# Patient Record
Sex: MALE | Race: WHITE | NOT HISPANIC OR LATINO | Employment: PART TIME | URBAN - METROPOLITAN AREA
[De-identification: names, ages, dates, MRNs, and addresses within clinical notes are randomized per-mention and may not be internally consistent; named-entity substitution may affect disease eponyms.]

---

## 2024-05-24 ENCOUNTER — HOSPITAL ENCOUNTER (EMERGENCY)
Facility: MEDICAL CENTER | Age: 59
End: 2024-05-24
Attending: STUDENT IN AN ORGANIZED HEALTH CARE EDUCATION/TRAINING PROGRAM
Payer: MEDICAID

## 2024-05-24 VITALS
SYSTOLIC BLOOD PRESSURE: 117 MMHG | OXYGEN SATURATION: 98 % | HEART RATE: 85 BPM | DIASTOLIC BLOOD PRESSURE: 76 MMHG | BODY MASS INDEX: 28.23 KG/M2 | RESPIRATION RATE: 16 BRPM | WEIGHT: 220 LBS | TEMPERATURE: 97.6 F | HEIGHT: 74 IN

## 2024-05-24 DIAGNOSIS — F15.10 METHAMPHETAMINE ABUSE (HCC): ICD-10-CM

## 2024-05-24 DIAGNOSIS — R45.851 SUICIDAL IDEATION: ICD-10-CM

## 2024-05-24 LAB
AMPHET UR QL SCN: POSITIVE
BARBITURATES UR QL SCN: NEGATIVE
BENZODIAZ UR QL SCN: NEGATIVE
BZE UR QL SCN: NEGATIVE
CANNABINOIDS UR QL SCN: POSITIVE
FENTANYL UR QL: NEGATIVE
METHADONE UR QL SCN: NEGATIVE
OPIATES UR QL SCN: NEGATIVE
OXYCODONE UR QL SCN: NEGATIVE
PCP UR QL SCN: NEGATIVE
POC BREATHALIZER: 0 PERCENT (ref 0–0.01)
PROPOXYPH UR QL SCN: NEGATIVE

## 2024-05-24 PROCEDURE — 302970 POC BREATHALIZER: Performed by: STUDENT IN AN ORGANIZED HEALTH CARE EDUCATION/TRAINING PROGRAM

## 2024-05-24 PROCEDURE — 700102 HCHG RX REV CODE 250 W/ 637 OVERRIDE(OP): Performed by: STUDENT IN AN ORGANIZED HEALTH CARE EDUCATION/TRAINING PROGRAM

## 2024-05-24 PROCEDURE — 90791 PSYCH DIAGNOSTIC EVALUATION: CPT

## 2024-05-24 PROCEDURE — 700102 HCHG RX REV CODE 250 W/ 637 OVERRIDE(OP): Mod: UD | Performed by: EMERGENCY MEDICINE

## 2024-05-24 PROCEDURE — A9270 NON-COVERED ITEM OR SERVICE: HCPCS | Mod: UD | Performed by: EMERGENCY MEDICINE

## 2024-05-24 PROCEDURE — 80307 DRUG TEST PRSMV CHEM ANLYZR: CPT

## 2024-05-24 PROCEDURE — 99285 EMERGENCY DEPT VISIT HI MDM: CPT

## 2024-05-24 PROCEDURE — A9270 NON-COVERED ITEM OR SERVICE: HCPCS | Performed by: STUDENT IN AN ORGANIZED HEALTH CARE EDUCATION/TRAINING PROGRAM

## 2024-05-24 RX ORDER — BUPROPION HYDROCHLORIDE 150 MG/1
150 TABLET, EXTENDED RELEASE ORAL
Status: SHIPPED | COMMUNITY
End: 2024-07-07

## 2024-05-24 RX ORDER — TRAZODONE HYDROCHLORIDE 100 MG/1
150 TABLET ORAL NIGHTLY
Status: SHIPPED | COMMUNITY
End: 2024-07-07

## 2024-05-24 RX ORDER — ALBUTEROL SULFATE 90 UG/1
1-2 INHALANT RESPIRATORY (INHALATION) EVERY 4 HOURS PRN
COMMUNITY

## 2024-05-24 RX ORDER — DIAZEPAM 5 MG/1
5 TABLET ORAL ONCE
Status: COMPLETED | OUTPATIENT
Start: 2024-05-24 | End: 2024-05-24

## 2024-05-24 RX ORDER — QUETIAPINE FUMARATE 100 MG/1
400 TABLET, FILM COATED ORAL ONCE
Status: COMPLETED | OUTPATIENT
Start: 2024-05-24 | End: 2024-05-24

## 2024-05-24 RX ORDER — ESCITALOPRAM OXALATE 20 MG/1
20 TABLET ORAL DAILY
Status: SHIPPED | COMMUNITY
End: 2024-07-07

## 2024-05-24 RX ORDER — TIOTROPIUM BROMIDE INHALATION SPRAY 3.12 UG/1
5 SPRAY, METERED RESPIRATORY (INHALATION) DAILY
COMMUNITY

## 2024-05-24 RX ORDER — QUETIAPINE FUMARATE 100 MG/1
100 TABLET, FILM COATED ORAL 2 TIMES DAILY
Status: DISCONTINUED | OUTPATIENT
Start: 2024-05-24 | End: 2024-05-24 | Stop reason: HOSPADM

## 2024-05-24 RX ORDER — ALBUTEROL SULFATE 90 UG/1
1-2 INHALANT RESPIRATORY (INHALATION) EVERY 4 HOURS PRN
Status: DISCONTINUED | OUTPATIENT
Start: 2024-05-24 | End: 2024-05-24 | Stop reason: HOSPADM

## 2024-05-24 RX ORDER — TRAZODONE HYDROCHLORIDE 50 MG/1
100 TABLET, FILM COATED ORAL NIGHTLY
Status: DISCONTINUED | OUTPATIENT
Start: 2024-05-24 | End: 2024-05-24 | Stop reason: HOSPADM

## 2024-05-24 RX ORDER — QUETIAPINE 400 MG/1
400 TABLET, FILM COATED, EXTENDED RELEASE ORAL NIGHTLY
Status: SHIPPED | COMMUNITY
End: 2024-07-07

## 2024-05-24 RX ADMIN — TIOTROPIUM BROMIDE INHALATION SPRAY 5 MCG: 3.12 SPRAY, METERED RESPIRATORY (INHALATION) at 13:49

## 2024-05-24 RX ADMIN — DIAZEPAM 5 MG: 5 TABLET ORAL at 02:33

## 2024-05-24 RX ADMIN — QUETIAPINE FUMARATE 400 MG: 100 TABLET ORAL at 05:21

## 2024-05-24 NOTE — DISCHARGE PLANNING
"ALERT team  note:  58 year old male BIB EMS d/t passing suicidal ideation, to get shot by police, auditory hallucinations, legal hold; paranoia and delusions, and current substance use including Methamphetamines and ETOH; UDS + for Amphetamines and THC; he states current substance use include ETOH up to 2 pints daily with last use 5/23/24, Methamphetamines vocationally with last use 5/23/24, and THC occasionally with last use this week; he denies current outpt MH providers; states h/o psych meds include Seroquel, Trazodone, and Buspar but has not taken for monthes; with recent CD tx, sober living, at 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 ED Alert team RN last night); he denies owning or access to a gun or weapons; no acute MH crisis noted a this time and pt actively participating in safe DC planning; writer RN updated [facility] ERP Dr. Mcclain and legal hold DC'd    Writer RN reviewed community CD and MH resources with pt, with written information given, including AA mtgs, [facility], [facility], [facility], and  [facility] as his [place] insurance application is submitted and pending authorization; writer RN encouraged pt to f/u at outpt MH service and attend a sober support meeting; pt verbalized understanding; pt to DC to self to today with a 1 day " bus pass given    [place] Medicaid insurance plan pending

## 2024-05-24 NOTE — ED NOTES
Pt resting in bed, GCS 15, NAD, 1:1 sitter at door, pt aware POC to transfer to appropriate facility when available

## 2024-05-24 NOTE — ED NOTES
Patient's home medications have been reviewed by the pharmacy team.     No past medical history on file.    Patient's Medications   New Prescriptions    No medications on file   Previous Medications    ALBUTEROL 108 (90 BASE) MCG/ACT AERO SOLN INHALATION AEROSOL    Inhale 1-2 Puffs every four hours as needed for Shortness of Breath.    BUPROPION SR (WELLBUTRIN-SR) 150 MG TABLET SR 12 HR SUSTAINED-RELEASE TABLET    Take 150 mg by mouth.    ESCITALOPRAM (LEXAPRO) 20 MG TABLET    Take 20 mg by mouth every day.    QUETIAPINE (SEROQUEL XR) 400 MG XR TABLET    Take 400 mg by mouth every evening.    TIOTROPIUM (SPIRIVA RESPIMAT) 2.5 MCG/ACT AERO SOLN    Inhale 5 mcg every day.    TRAZODONE (DESYREL) 100 MG TAB    Take 150 mg by mouth every evening.   Modified Medications    No medications on file   Discontinued Medications    TIOTROPIUM BROMIDE MONOHYDRATE INH    Inhale every day.          A:  Medications do not appear to be contributing to current complaints.     Medication history obtained from discussion with [facility] pharmacy and the patient. Patient reports not taking home medications for last 30 days      P:    Home medications have been reordered as appropriate per discussion with provider     Since patient reported not taking home medications for last 30 days quetiapine and trazodone were started at lower starting dose, did not order lexapro or wellbutrin per discussion with provider.    Home inhalers ordered             Poppy Banks, PharmD

## 2024-05-24 NOTE — ED NOTES
Pt discharged to lobby with steady gait. GCS 15. Pt in possession of belongings. Pt provided discharge education and information pertaining to medications and follow up appointments. Pt received copy of discharge instructions and verbalized understanding.     Vitals:    05/24/24 1000   BP: 117/76   Pulse: 85   Resp: 16   Temp: 36.4 °C (97.6 °F)   SpO2: 98%

## 2024-05-24 NOTE — DISCHARGE PLANNING
[facility] ED Behavioral Health Fax Referral      Referral: Legal Hold    Intervention: Patient referral to community inpatient  facillity    Legal Hold Initiated: Date: 05/24/2024 Time: --    Patient’s Insurance Listed on Face Sheet: None Listed; Medicaid possibly pending    Referrals sent to: [facility], [facility], [facility], and [facility] via [facility]     Referrals faxed by RYLEY Arrington    This referral contains the following information:  Face sheet _x___  Page 1 and Page 2 of Legal Hold __x__  Alert Team Assessment/Psych Assessment __x__  Head to toe physical exam __x__  Urine Drug Screen ____  Blood Alcohol ___x_  Vital signs __x__  Pregnancy test when applicable ___  Medications list _x___  Covid screening ____    Plan: Patient will transfer to mental health facility once acceptance is obtained    For all referral information, please contact the  referral office daily between the hours of 7:00 a.m. to 6:00 p.m. at:   Phone [phone]   Fax [phone]    ED  Alert Team   Phone [phone] Fax [phone]    [facility] Emergency Department Contact numbers:  Green Pod [phone]   Blue Pod [phone]   Red Pod [phone]   Pediatrics [phone]

## 2024-05-24 NOTE — ED NOTES
Med Rec partial complete per patient   Allergies reviewed  Antibiotics in the past 30 days:no  Anticoagulant in past 14 days:no  Pharmacy patient utilizes:[facility] on [address]    Pt unable to verify some strengths of medication. Pt states he ran out of his medications a month ago. Unable to verify medications/strengths/directions from pharmacy at this time (closed)

## 2024-05-24 NOTE — ED NOTES
Patient changed into paper scrubs, all non-essential equipment removed from room. Urine sample obtained and sent to lab. Patient cooperative with POC breathalyzer.
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
## 2024-05-24 NOTE — ED TRIAGE NOTES
"Chief Complaint   Patient presents with    Suicidal Ideation     Patient BIBA for hallucinations and SI. Patient states that he has been out of his medications for about a month, and has since started using meth again. Reports last meth use yesterday. Patient is calm and cooperative with assessment. Patient states he has plan to kill himself via .       BIB EMS to [place] [id number], pt on monitor and in gown, labs drawn and sent. Pt consists of: SI with hallucinations.    Medications given en route:None    Ht 1.88 m (6' 2\")   Wt 99.8 kg (220 lb)   BMI 28.25 kg/m²   "

## 2024-05-24 NOTE — DISCHARGE PLANNING
Alert Team/Behavioral Health   Note:      [facility] ALERT TEAM DISCHARGE PLANNING NOTE    Date:  5/24/24  Patient Name:  Krystina Bonner - 58 y.o. - Discharge Planning  MRN:  [id number]   YOB: 1965  ADMISSION DATE:  5/24/2024     Writer forwarded referral packet for inpatient psychiatric care to the following community providers:  [facility]    Items included in the referral packet:   _x____Face Sheet   _x____Pages 1 and 2 of completed legal hold   _x____Alert Team/Psych Assessment   _x____H&P   _x____UDS   _x____Blood Alcohol   _x____Vital signs   _n/a____Pregnancy Test (if applicable)   _x____Medications List   _____Covid Screen

## 2024-05-24 NOTE — ED NOTES
Bedside report from RYLEY Mayen. Pt resting in r[place]ell with even and unlabored chest rise and fall, awakes easily to stimuli. 1:1 sitter in place. Care will continue as ordered.  Pt is on RA, GCS 15.  Necessary fall precautions in place.

## 2024-05-24 NOTE — ED PROVIDER NOTES
"ED Provider Note    CHIEF COMPLAINT  Chief Complaint   Patient presents with    Suicidal Ideation       EXTERNAL RECORDS REVIEWED  Other no prior records available    HPI/ROS  LIMITATION TO HISTORY   Select: : None      Eliushona Ankit Bonner is a 58 y.o. male who presents to the emergency department for evaluation of auditory hallucinations and suicidal ideation.  He reports a history of bipolar disorder and schizophrenia on multiple medications that he has not been taking recently because he ran out.  He reports that the auditory hallucinations have worsened and are quite aggressive and telling him to kill himself.  He states that he smoked methamphetamine yesterday which exacerbated his symptoms.  He states that if he did not come to the ER tonight he was going to commit suicide by .  He states that he cut himself on the right arm with a paring knife in an attempt to relieve stress but not kill himself.  He denies doing anything else to hurt himself today including taking medications.    PAST MEDICAL HISTORY   Bipolar disorder, schizophrenia    SURGICAL HISTORY  patient denies any surgical history    FAMILY HISTORY  No family history on file.    SOCIAL HISTORY  Methamphetamine use, alcohol use, marijuana use      CURRENT MEDICATIONS  Home Medications    **Home medications have not yet been reviewed for this encounter**         ALLERGIES  No Known Allergies    PHYSICAL EXAM  VITAL SIGNS: Ht 1.88 m (6' 2\")   Wt 99.8 kg (220 lb)   BMI 28.25 kg/m²    Constitutional: Hyperactive, anxious  HENT: Normocephalic, Atraumatic, Bilateral external ears normal. Nose normal.   Eyes: Pupils are somewhat dilated but equal. Conjunctiva normal, non-icteric.   Heart: Tachycardic, regular  Lungs: No respiratory distress  GI: Soft nontender nondistended   Musculoskeletal: No obvious deformity. No leg edema.  Skin: Warm, moist, No erythema, No rash.  Linear abrasion to the right anterior forearm with no associated bleeding.  No " laceration.  Neurologic: Alert and oriented x 3, Cranial nerves III through XII grossly intact no sensory deficit no cerebellar dysfunction.   Psychiatric: Anxious, suicidal ideation      EKG/LABS  Labs Reviewed   URINE DRUG SCREEN - Abnormal; Notable for the following components:       Result Value    Amphetamines Urine Positive (*)     Cannabinoid Metab Positive (*)     All other components within normal limits   POC BREATHALIZER - Normal         COURSE & MEDICAL DECISION MAKING    ASSESSMENT, COURSE AND PLAN  Care Narrative:     Patient arrives emergency department for evaluation of auditory hallucinations and suicidal ideation in the setting of methamphetamine abuse.  Also history of schizophrenia, bipolar disorder, medication nonadherence.  Likely exacerbation of underlying psychiatric illness in the setting of sympathomimetic use.  Evidence of current sympathomimetic intoxication the patient is otherwise calm and cooperative with medical care and requested diagnostic testing.  Will treat with oral Valium and plan for behavioral health evaluation.    ED OBS: Yes; I am placing the patient in to an observation status due to a diagnostic uncertainty as well as therapeutic intensity. Patient placed in observation status at 2:18 AM, 5/24/2024.     Observation plan is as follows: Observation pending clinical sobriety and evaluation by behavioral health team members to assess need for inpatient psychiatric care     Behavioral health team member Rose evaluated the patient and agrees that his psychiatric decompensation is severe enough to warrant inpatient hospitalization.  Legal hold initiated and certified by myself though patient cooperating with plan of care for inpatient transfer.  Will plan for observation in the ER until safe inpatient transfer can be arranged.    ADDITIONAL PROBLEMS MANAGED  Polysubstance abuse disorder    DISPOSITION AND DISCUSSIONS  I have discussed management of the patient with the  following physicians and WADE's: Oncoming ERP Dr. Mcclain    Discussion of management with other Q[facility] or appropriate source(s): None     Barriers to care at this time, including but not limited to: Patient does not have established PCP and Patient had difficult affording medications.       FINAL DIAGNOSIS  1. Suicidal ideation    2. Methamphetamine abuse (HCC)           Electronically signed by: Dylon Mansfield M.D., 5/24/2024 2:18 AM

## 2024-05-24 NOTE — ED NOTES
Pt resting in room. Pt given a pillow for comfort. Sitter outside of room. No acute distress noted. Will continue to monitor.

## 2024-05-24 NOTE — DISCHARGE PLANNING
Alert Team/Behavioral Health   Note:      - [facility]: Patient is pending [place] Medicaid. Since patient first is given Medicaid FFS before it can be changed to another [place] Medicaid plan, referral has to further reviewed by  and financial office.  @1110:  (Raymond) called. Cannot take pending Medicaid per financial office. Will need Medicaid to be active prior to being able to accept.    - [facility]: Talked to  (Dasia). Does not take pending Medicaid. Active Medicaid also has to appear as elgibile on EVS.    - [facility] : Does not accept pending Medicaid.      - [facility]: Sent referral @ 1414.  Will not be taking any admissions until 5/31/24 due to Covid outbreak.

## 2024-05-24 NOTE — ED NOTES
Pt resting in room. Sitter outside of door. Vs stable. No acute distress noted. Will continue to monitor.

## 2024-05-24 NOTE — DISCHARGE SUMMARY
"  ED Observation Discharge Summary    Patient:Krystina Bonner  Patient : 1965  Patient MRN: [id number]  Patient PCP: Pcp Unknown    Admit Date: 2024  Discharge Date and Time: 24 1:26 PM  Discharge Diagnosis:   1. Suicidal ideation    2. Methamphetamine abuse (HCC)        Discharge Attending: Marcell Mcclain M.D.  Discharge Service: ED Observation    ED Course  Krystina is a 58 y.o. male who was evaluated at [facility] emergency department with methamphetamine use and suicidal ideation.  Is been a bit paranoid.  He rested well in the emergency department.  He is now feeling better.  Seen by the behavioral health team.  We both agree that this patient does not meet legal 2000 criteria.  He has safety plan.  Given outpatient resources.  Plans to follow-up and go to AA.  Also plans to try to stop using drugs and alcohol.  He has some section 8 housing.  He is forward thinking and future oriented.    Discharge Exam:  /76   Pulse 85   Temp 36.4 °C (97.6 °F) (Temporal)   Resp 16   Ht 1.88 m (6' 2\")   Wt 99.8 kg (220 lb)   SpO2 98%   BMI 28.25 kg/m² .    Constitutional: Awake and alert. Nontoxic  HENT:  Grossly normal  Eyes: Grossly normal  Neck: Normal range of motion  Cardiovascular: Normal heart rate   Thorax & Lungs: No respiratory distress  Abdomen: Nontender  Skin:  No pathologic rash.   Extremities: Well perfused  Psychiatric: Affect normal    Labs  Results for orders placed or performed during the hospital encounter of 24   Urine Drug Screen   Result Value Ref Range    Amphetamines Urine Positive (A) Negative    Barbiturates Negative Negative    Benzodiazepines Negative Negative    Cocaine Metabolite Negative Negative    Fentanyl, Urine Negative Negative    Methadone Negative Negative    Opiates Negative Negative    Oxycodone Negative Negative    Phencyclidine -Pcp Negative Negative    Propoxyphene Negative Negative    Cannabinoid Metab Positive (A) Negative   POC " BREATHALIZER   Result Value Ref Range    POC Breathalizer 0.000 0.00 - 0.01 Percent       Radiology  No orders to display       Medications:   New Prescriptions    No medications on file       My final assessment includes   1. Suicidal ideation    2. Methamphetamine abuse (HCC)        Upon Reevaluation, the patient's condition has: Improved; and will be discharged.    Patient discharged from ED Observation status at 1:27 PM  (Time) 5/24/2024  (Date).     Total time spent on this ED Observation discharge encounter is < 30 Minutes    Electronically signed by: Marcell Mcclain M.D., 5/24/2024 1:26 PM

## 2024-05-24 NOTE — ED NOTES
Bedside report received from off going RN, assumed care of patient.  POC discussed with patient. Call light within reach, all needs addressed at this time.       Fall risk interventions in place: Keep floor surfaces clean and dry (all applicable per [place] Fall risk assessment)   Continuous monitoring: Not Applicable   IVF/IV medications: Not Applicable   Oxygen: Room Air  Bedside sitter: Pt on L2k SI with 1:1 sitter   (name), Report given to sitter, checklist completed, and checklist completed, stop sign in doorway  Isolation: Not Applicable

## 2024-05-24 NOTE — ED NOTES
Pt provided all belongings and is changing in room. Pt is becoming verbally aggressive since being told he was being discharged, security on standby.

## 2024-07-07 ENCOUNTER — APPOINTMENT (OUTPATIENT)
Dept: RADIOLOGY | Facility: MEDICAL CENTER | Age: 59
End: 2024-07-07
Attending: STUDENT IN AN ORGANIZED HEALTH CARE EDUCATION/TRAINING PROGRAM

## 2024-07-07 ENCOUNTER — HOSPITAL ENCOUNTER (EMERGENCY)
Facility: MEDICAL CENTER | Age: 59
End: 2024-07-07
Attending: STUDENT IN AN ORGANIZED HEALTH CARE EDUCATION/TRAINING PROGRAM

## 2024-07-07 VITALS
WEIGHT: 214.51 LBS | DIASTOLIC BLOOD PRESSURE: 74 MMHG | TEMPERATURE: 98.6 F | OXYGEN SATURATION: 95 % | SYSTOLIC BLOOD PRESSURE: 127 MMHG | RESPIRATION RATE: 13 BRPM | HEART RATE: 97 BPM | HEIGHT: 75 IN | BODY MASS INDEX: 26.67 KG/M2

## 2024-07-07 DIAGNOSIS — F32.A DEPRESSION, UNSPECIFIED DEPRESSION TYPE: ICD-10-CM

## 2024-07-07 DIAGNOSIS — F10.930 ALCOHOL WITHDRAWAL SYNDROME WITHOUT COMPLICATION (HCC): ICD-10-CM

## 2024-07-07 LAB
ALBUMIN SERPL BCP-MCNC: 4.8 G/DL (ref 3.2–4.9)
ALBUMIN/GLOB SERPL: 1.9 G/DL
ALP SERPL-CCNC: 118 U/L (ref 30–99)
ALT SERPL-CCNC: 191 U/L (ref 2–50)
AMPHET UR QL SCN: NEGATIVE
ANION GAP SERPL CALC-SCNC: 19 MMOL/L (ref 7–16)
APPEARANCE UR: ABNORMAL
AST SERPL-CCNC: 190 U/L (ref 12–45)
BACTERIA #/AREA URNS HPF: NEGATIVE /HPF
BARBITURATES UR QL SCN: NEGATIVE
BASOPHILS # BLD AUTO: 0.3 % (ref 0–1.8)
BASOPHILS # BLD: 0.03 K/UL (ref 0–0.12)
BENZODIAZ UR QL SCN: NEGATIVE
BILIRUB SERPL-MCNC: 1.5 MG/DL (ref 0.1–1.5)
BILIRUB UR QL STRIP.AUTO: ABNORMAL
BUN SERPL-MCNC: 14 MG/DL (ref 8–22)
BZE UR QL SCN: NEGATIVE
CALCIUM ALBUM COR SERPL-MCNC: 9.2 MG/DL (ref 8.5–10.5)
CALCIUM SERPL-MCNC: 9.8 MG/DL (ref 8.5–10.5)
CANNABINOIDS UR QL SCN: POSITIVE
CHLORIDE SERPL-SCNC: 98 MMOL/L (ref 96–112)
CO2 SERPL-SCNC: 21 MMOL/L (ref 20–33)
COLOR UR: ABNORMAL
CREAT SERPL-MCNC: 1 MG/DL (ref 0.5–1.4)
EOSINOPHIL # BLD AUTO: 0.01 K/UL (ref 0–0.51)
EOSINOPHIL NFR BLD: 0.1 % (ref 0–6.9)
EPI CELLS #/AREA URNS HPF: NEGATIVE /HPF
ERYTHROCYTE [DISTWIDTH] IN BLOOD BY AUTOMATED COUNT: 46.6 FL (ref 35.9–50)
ETHANOL BLD-MCNC: <10.1 MG/DL
FENTANYL UR QL: NEGATIVE
GFR SERPLBLD CREATININE-BSD FMLA CKD-EPI: 87 ML/MIN/1.73 M 2
GLOBULIN SER CALC-MCNC: 2.5 G/DL (ref 1.9–3.5)
GLUCOSE SERPL-MCNC: 103 MG/DL (ref 65–99)
GLUCOSE UR STRIP.AUTO-MCNC: NEGATIVE MG/DL
HCT VFR BLD AUTO: 49.5 % (ref 42–52)
HGB BLD-MCNC: 17.5 G/DL (ref 14–18)
HYALINE CASTS #/AREA URNS LPF: ABNORMAL /LPF
IMM GRANULOCYTES # BLD AUTO: 0.07 K/UL (ref 0–0.11)
IMM GRANULOCYTES NFR BLD AUTO: 0.7 % (ref 0–0.9)
KETONES UR STRIP.AUTO-MCNC: 80 MG/DL
LEUKOCYTE ESTERASE UR QL STRIP.AUTO: ABNORMAL
LIPASE SERPL-CCNC: 20 U/L (ref 11–82)
LYMPHOCYTES # BLD AUTO: 0.95 K/UL (ref 1–4.8)
LYMPHOCYTES NFR BLD: 9.4 % (ref 22–41)
MCH RBC QN AUTO: 32 PG (ref 27–33)
MCHC RBC AUTO-ENTMCNC: 35.4 G/DL (ref 32.3–36.5)
MCV RBC AUTO: 90.5 FL (ref 81.4–97.8)
METHADONE UR QL SCN: NEGATIVE
MICRO URNS: ABNORMAL
MONOCYTES # BLD AUTO: 1.03 K/UL (ref 0–0.85)
MONOCYTES NFR BLD AUTO: 10.2 % (ref 0–13.4)
NEUTROPHILS # BLD AUTO: 8.05 K/UL (ref 1.82–7.42)
NEUTROPHILS NFR BLD: 79.3 % (ref 44–72)
NITRITE UR QL STRIP.AUTO: NEGATIVE
NRBC # BLD AUTO: 0 K/UL
NRBC BLD-RTO: 0 /100 WBC (ref 0–0.2)
OPIATES UR QL SCN: NEGATIVE
OXYCODONE UR QL SCN: NEGATIVE
PCP UR QL SCN: NEGATIVE
PH UR STRIP.AUTO: 5 [PH] (ref 5–8)
PLATELET # BLD AUTO: 129 K/UL (ref 164–446)
PMV BLD AUTO: 10.2 FL (ref 9–12.9)
POTASSIUM SERPL-SCNC: 4.1 MMOL/L (ref 3.6–5.5)
PROPOXYPH UR QL SCN: NEGATIVE
PROT SERPL-MCNC: 7.3 G/DL (ref 6–8.2)
PROT UR QL STRIP: 100 MG/DL
RBC # BLD AUTO: 5.47 M/UL (ref 4.7–6.1)
RBC # URNS HPF: ABNORMAL /HPF
RBC UR QL AUTO: ABNORMAL
SODIUM SERPL-SCNC: 138 MMOL/L (ref 135–145)
SP GR UR STRIP.AUTO: 1.03
UROBILINOGEN UR STRIP.AUTO-MCNC: 1 MG/DL
WBC # BLD AUTO: 10.1 K/UL (ref 4.8–10.8)
WBC #/AREA URNS HPF: ABNORMAL /HPF

## 2024-07-07 PROCEDURE — 700117 HCHG RX CONTRAST REV CODE 255: Performed by: STUDENT IN AN ORGANIZED HEALTH CARE EDUCATION/TRAINING PROGRAM

## 2024-07-07 PROCEDURE — 700102 HCHG RX REV CODE 250 W/ 637 OVERRIDE(OP): Performed by: STUDENT IN AN ORGANIZED HEALTH CARE EDUCATION/TRAINING PROGRAM

## 2024-07-07 PROCEDURE — 80307 DRUG TEST PRSMV CHEM ANLYZR: CPT

## 2024-07-07 PROCEDURE — 85025 COMPLETE CBC W/AUTO DIFF WBC: CPT

## 2024-07-07 PROCEDURE — 700101 HCHG RX REV CODE 250: Performed by: STUDENT IN AN ORGANIZED HEALTH CARE EDUCATION/TRAINING PROGRAM

## 2024-07-07 PROCEDURE — 80053 COMPREHEN METABOLIC PANEL: CPT

## 2024-07-07 PROCEDURE — 83690 ASSAY OF LIPASE: CPT

## 2024-07-07 PROCEDURE — 82077 ASSAY SPEC XCP UR&BREATH IA: CPT

## 2024-07-07 PROCEDURE — 90791 PSYCH DIAGNOSTIC EVALUATION: CPT

## 2024-07-07 PROCEDURE — 74177 CT ABD & PELVIS W/CONTRAST: CPT

## 2024-07-07 PROCEDURE — A9270 NON-COVERED ITEM OR SERVICE: HCPCS | Performed by: STUDENT IN AN ORGANIZED HEALTH CARE EDUCATION/TRAINING PROGRAM

## 2024-07-07 PROCEDURE — 81001 URINALYSIS AUTO W/SCOPE: CPT

## 2024-07-07 PROCEDURE — 96366 THER/PROPH/DIAG IV INF ADDON: CPT

## 2024-07-07 PROCEDURE — 96365 THER/PROPH/DIAG IV INF INIT: CPT

## 2024-07-07 PROCEDURE — 99285 EMERGENCY DEPT VISIT HI MDM: CPT

## 2024-07-07 PROCEDURE — 700111 HCHG RX REV CODE 636 W/ 250 OVERRIDE (IP): Performed by: STUDENT IN AN ORGANIZED HEALTH CARE EDUCATION/TRAINING PROGRAM

## 2024-07-07 PROCEDURE — 96375 TX/PRO/DX INJ NEW DRUG ADDON: CPT

## 2024-07-07 PROCEDURE — 36415 COLL VENOUS BLD VENIPUNCTURE: CPT

## 2024-07-07 RX ORDER — LORAZEPAM 2 MG/ML
1 INJECTION INTRAMUSCULAR
Status: DISCONTINUED | OUTPATIENT
Start: 2024-07-07 | End: 2024-07-07 | Stop reason: HOSPADM

## 2024-07-07 RX ORDER — LORAZEPAM 2 MG/ML
2 INJECTION INTRAMUSCULAR
Status: DISCONTINUED | OUTPATIENT
Start: 2024-07-07 | End: 2024-07-07 | Stop reason: HOSPADM

## 2024-07-07 RX ORDER — QUETIAPINE 400 MG/1
400 TABLET, FILM COATED, EXTENDED RELEASE ORAL NIGHTLY
Qty: 30 TABLET | Refills: 3 | Status: SHIPPED | OUTPATIENT
Start: 2024-07-07

## 2024-07-07 RX ORDER — ESCITALOPRAM OXALATE 20 MG/1
20 TABLET ORAL DAILY
Qty: 30 TABLET | Refills: 0 | Status: SHIPPED | OUTPATIENT
Start: 2024-07-07

## 2024-07-07 RX ORDER — DIAZEPAM 10 MG/2ML
10 INJECTION, SOLUTION INTRAMUSCULAR; INTRAVENOUS ONCE
Status: COMPLETED | OUTPATIENT
Start: 2024-07-07 | End: 2024-07-07

## 2024-07-07 RX ORDER — CHLORDIAZEPOXIDE HYDROCHLORIDE 25 MG/1
25 CAPSULE, GELATIN COATED ORAL ONCE
Status: COMPLETED | OUTPATIENT
Start: 2024-07-07 | End: 2024-07-07

## 2024-07-07 RX ORDER — BUPROPION HYDROCHLORIDE 150 MG/1
150 TABLET, EXTENDED RELEASE ORAL 2 TIMES DAILY
Qty: 60 TABLET | Refills: 0 | Status: SHIPPED | OUTPATIENT
Start: 2024-07-07

## 2024-07-07 RX ORDER — LORAZEPAM 2 MG/1
2 TABLET ORAL
Status: DISCONTINUED | OUTPATIENT
Start: 2024-07-07 | End: 2024-07-07 | Stop reason: HOSPADM

## 2024-07-07 RX ORDER — FOLIC ACID 1 MG/1
1 TABLET ORAL DAILY
Status: DISCONTINUED | OUTPATIENT
Start: 2024-07-08 | End: 2024-07-07 | Stop reason: HOSPADM

## 2024-07-07 RX ORDER — LORAZEPAM 2 MG/1
4 TABLET ORAL
Status: DISCONTINUED | OUTPATIENT
Start: 2024-07-07 | End: 2024-07-07 | Stop reason: HOSPADM

## 2024-07-07 RX ORDER — GAUZE BANDAGE 2" X 2"
100 BANDAGE TOPICAL DAILY
Status: DISCONTINUED | OUTPATIENT
Start: 2024-07-08 | End: 2024-07-07 | Stop reason: HOSPADM

## 2024-07-07 RX ORDER — TRAZODONE HYDROCHLORIDE 150 MG/1
150 TABLET ORAL NIGHTLY
Qty: 30 TABLET | Refills: 3 | Status: SHIPPED | OUTPATIENT
Start: 2024-07-07

## 2024-07-07 RX ORDER — QUETIAPINE FUMARATE 100 MG/1
100 TABLET, FILM COATED ORAL NIGHTLY
Status: COMPLETED | OUTPATIENT
Start: 2024-07-07 | End: 2024-07-07

## 2024-07-07 RX ORDER — SODIUM CHLORIDE, SODIUM LACTATE, POTASSIUM CHLORIDE, CALCIUM CHLORIDE 600; 310; 30; 20 MG/100ML; MG/100ML; MG/100ML; MG/100ML
1000 INJECTION, SOLUTION INTRAVENOUS ONCE
Status: DISCONTINUED | OUTPATIENT
Start: 2024-07-07 | End: 2024-07-07

## 2024-07-07 RX ORDER — LORAZEPAM 1 MG/1
0.5 TABLET ORAL EVERY 4 HOURS PRN
Status: DISCONTINUED | OUTPATIENT
Start: 2024-07-07 | End: 2024-07-07 | Stop reason: HOSPADM

## 2024-07-07 RX ORDER — CHLORDIAZEPOXIDE HYDROCHLORIDE 25 MG/1
CAPSULE, GELATIN COATED ORAL
Qty: 10 CAPSULE | Refills: 0 | Status: SHIPPED | OUTPATIENT
Start: 2024-07-07 | End: 2024-07-11

## 2024-07-07 RX ORDER — LORAZEPAM 2 MG/ML
1.5 INJECTION INTRAMUSCULAR
Status: DISCONTINUED | OUTPATIENT
Start: 2024-07-07 | End: 2024-07-07 | Stop reason: HOSPADM

## 2024-07-07 RX ORDER — LORAZEPAM 2 MG/ML
0.5 INJECTION INTRAMUSCULAR EVERY 4 HOURS PRN
Status: DISCONTINUED | OUTPATIENT
Start: 2024-07-07 | End: 2024-07-07 | Stop reason: HOSPADM

## 2024-07-07 RX ORDER — LORAZEPAM 1 MG/1
1 TABLET ORAL EVERY 4 HOURS PRN
Status: DISCONTINUED | OUTPATIENT
Start: 2024-07-07 | End: 2024-07-07 | Stop reason: HOSPADM

## 2024-07-07 RX ADMIN — DIAZEPAM 10 MG: 5 INJECTION INTRAMUSCULAR; INTRAVENOUS at 01:33

## 2024-07-07 RX ADMIN — CHLORDIAZEPOXIDE HYDROCHLORIDE 25 MG: 25 CAPSULE ORAL at 04:30

## 2024-07-07 RX ADMIN — IOHEXOL 95 ML: 350 INJECTION, SOLUTION INTRAVENOUS at 02:13

## 2024-07-07 RX ADMIN — CHLORDIAZEPOXIDE HYDROCHLORIDE 25 MG: 25 CAPSULE ORAL at 03:15

## 2024-07-07 RX ADMIN — QUETIAPINE 100 MG: 100 TABLET, FILM COATED ORAL at 03:15

## 2024-07-07 RX ADMIN — THIAMINE HYDROCHLORIDE 1000 ML: 100 INJECTION, SOLUTION INTRAMUSCULAR; INTRAVENOUS at 01:45

## 2024-07-07 NOTE — DISCHARGE PLANNING
"[facility] Crisis/Safety Plan     Name: 0400  Date: 07/07/24    Warning Signs that a Crisis may be developing.  1.    Increase in anxiety       2.     Wanting to drink     Coping Strategies I can use on my own.  1.       Distract    2.        Counting  3.              Ways I can make my environment safe  1.   No access to drugs       2.    Jr access to firearns          Things can I tell myself when I feel a crisis developing  1.      \"That this will end if end,:     2.            3.     People I can contact for support or distraction  1.      Mom  2.      Sister    3.           If I'm not able to reach my support people, or the above strategies don't help, I can contact the following professionals, agencies, or hotlines:     1) [facility] [place] (24/7): [phone] or [phone]     "

## 2024-07-07 NOTE — ED PROVIDER NOTES
ED Provider Note    CHIEF COMPLAINT  Chief Complaint   Patient presents with    Flank Pain    Diarrhea    Depression       EXTERNAL RECORDS REVIEWED  External ED Note ED note from 1/27/2023 patient seen for suicidal ideations    HPI/ROS  LIMITATION TO HISTORY     OUTSIDE HISTORIAN(S):      Krystina Bonner is a 58 y.o. male who presents with flank pain, diarrhea, anxiety.  Patient says over the past 2 or 3 days she has been having bilateral flank pain intermittent nausea.  Patient says he does drink heavily regularly.  Patient says he stopped drinking today because he ran out of money.  Patient says he has had thoughts of dying and at one point had a plan to stab himself in the arm.  Patient says he was having auditory occlusions earlier.  Patient says he not been taking his psychiatric medications.        PAST MEDICAL HISTORY       SURGICAL HISTORY  patient denies any surgical history    FAMILY HISTORY  History reviewed. No pertinent family history.    SOCIAL HISTORY  Social History     Tobacco Use    Smoking status: Never    Smokeless tobacco: Never   Substance and Sexual Activity    Alcohol use: Yes    Drug use: Not Currently    Sexual activity: Not on file       CURRENT MEDICATIONS  Home Medications       Reviewed by Maritza Tamez R.N. (Registered Nurse) on 07/07/24 at 0048  Med List Status: Not Addressed     Medication Last Dose Status   albuterol 108 (90 Base) MCG/ACT Aero Soln inhalation aerosol  Active   buPROPion SR (WELLBUTRIN-SR) 150 MG TABLET SR 12 HR sustained-release tablet  Active   escitalopram (LEXAPRO) 20 MG tablet  Active   quetiapine (SEROQUEL XR) 400 MG XR tablet  Active   tiotropium (SPIRIVA RESPIMAT) 2.5 mcg/Act Aero Soln  Active   traZODone (DESYREL) 100 MG Tab  Active                  Audit from Redirected Encounters    **Home medications have not yet been reviewed for this encounter**         ALLERGIES  No Known Allergies    PHYSICAL EXAM  VITAL SIGNS: /74   Pulse 97   Temp  "37 °C (98.6 °F)   Resp 13   Ht 1.905 m (6' 3\")   Wt 97.3 kg (214 lb 8.1 oz)   SpO2 95%   BMI 26.81 kg/m²    Constitutional: Anxious, diaphoretic  HENT: No signs of trauma, Bilateral external ears normal, Nose normal.   Eyes: Pupils are equal and reactive, Conjunctiva normal, Non-icteric.   Neck: Normal range of motion, No tenderness, Supple, No stridor.   Cardiovascular: Tachycardic  Thorax & Lungs: Normal breath sounds, No respiratory distress, No wheezing, No chest tenderness.   Abdomen: Bowel sounds normal, Soft, No tenderness, No masses, No pulsatile masses.   Skin: Warm, Dry, No erythema, No rash.   Back: No bony tenderness, No CVA tenderness.   Extremities: Intact distal pulses, No edema, No tenderness, No cyanosis  Musculoskeletal: Good range of motion in all major joints. No tenderness to palpation or major deformities noted.   Neurologic: Alert , Normal motor function, Normal sensory function, No focal deficits noted.   Psychiatric: Calm   EKG/LABS  Labs Reviewed   URINE DRUG SCREEN - Abnormal; Notable for the following components:       Result Value    Cannabinoid Metab Positive (*)     All other components within normal limits   URINALYSIS,CULTURE IF INDICATED - Abnormal; Notable for the following components:    Character Cloudy (*)     Ketones 80 (*)     Protein 100 (*)     Bilirubin Moderate (*)     Leukocyte Esterase Trace (*)     Occult Blood Trace (*)     All other components within normal limits   CBC WITH DIFFERENTIAL - Abnormal; Notable for the following components:    Platelet Count 129 (*)     Neutrophils-Polys 79.30 (*)     Lymphocytes 9.40 (*)     Neutrophils (Absolute) 8.05 (*)     Lymphs (Absolute) 0.95 (*)     Monos (Absolute) 1.03 (*)     All other components within normal limits   URINE MICROSCOPIC (W/UA) - Abnormal; Notable for the following components:    WBC 0-2 (*)     RBC 0-2 (*)     Hyaline Cast 11-20 (*)     All other components within normal limits   COMP METABOLIC PANEL - " Abnormal; Notable for the following components:    Anion Gap 19.0 (*)     Glucose 103 (*)     AST(SGOT) 190 (*)     ALT(SGPT) 191 (*)     Alkaline Phosphatase 118 (*)     All other components within normal limits   DIAGNOSTIC ALCOHOL   LIPASE   ESTIMATED GFR           RADIOLOGY/PROCEDURES   I have independently interpreted the diagnostic imaging associated with this visit and am waiting the final reading from the radiologist.   My preliminary interpretation is as follows: No free air    Radiologist interpretation:  CT-ABDOMEN-PELVIS WITH   Final Result      1.  There is no acute inflammatory process within the abdomen or pelvis.   2.  No nephrolithiasis or urolithiasis. No hydronephrosis.          COURSE & MEDICAL DECISION MAKING    ASSESSMENT, COURSE AND PLAN  Care Narrative: Arrival patient noted to be tachycardic, anxious appearing and diaphoretic slightly tremulous.  Patient did express suicidal ideations with plan to stab himself in the arm.  Will treat patient's suspected alcohol withdrawal symptoms and closely reassess mental status and suicidality.  Regarding patient's flank pain he does not have CVA tenderness and has no distention or tenderness of abdominal exam.  Given patient's reports of intermittent nausea presenting tachycardia ill-appearing is plan for CT imaging to assess for nephrolithiasis, diverticulitis or appendicitis.  Will obtain blood work to assess for gross hematologic or metabolic derangements.    After initial IV fluids and dose of Valium patient with significant treatment and alcohol withdrawal symptoms.  Patient dosed with Librium.  Blood work with moderate thrombocytopenia mild hyperglycemia and elevation in LFTs.  Urinalysis without convincing signs of infection.  CT imaging without acute abnormality.  Patient was assessed by the alert team.  Patient is future forward thinking requesting new prescriptions for psychiatric medications no ongoing suicidal ideations or plan and has plans  to present to recovery center.  On my reassessment patient tachycardia has resolved, he is speaking clearly no ongoing hallucinations or SI.  Based on this I do not believe patient meets criteria for legal hold.  Patient's psychiatric medications were renewed.  Patient does appear motivated to stop drinking.  Patient given prescription for Librium.  Patient was able to tolerate p.o. and ambulate without difficulty.  Patient comfortable with return precautions outpatient follow-up plan.            ADDITIONAL PROBLEMS MANAGED      DISPOSITION AND DISCUSSIONS  Barriers to care at this time, including but not limited to: Patient is homeless.     Decision tools and prescription drugs considered including, but not limited to:  Librium .    FINAL DIAGNOSIS  1. Alcohol withdrawal syndrome without complication (HCC)    2. Depression, unspecified depression type           Electronically signed by: Jimbo Spring D.O., 7/7/2024 1:10 AM

## 2024-07-07 NOTE — CONSULTS
"  Name: Krystina Bonner  MRN: [id number]  : 1965  Age: 58 y.o.  Date of assessment: 2024  PCP: Pcp Unknown  Persons in attendance: Patient  Patient Location: [facility]    CHIEF COMPLAINT/PRESENTING ISSUE (as stated by pt):   Chief Complaint   Patient presents with    Flank Pain    Diarrhea    Depression      PT is a 58 year old male BIBA for flank pain, alcohol withdrawal, and SI. PT medically clear err ERP. PT reports that he relapsed on alcohol in April and has been struggling since. He reports that he spoke to mother earlier today which prompted him to come to the ED. He also reports that he was having hallucinations which further prompted mychal to come to the ED. PT presents as A + O x 4 with calm mood, congruent affect, linear, logical, future-oriented, cooperative, pleasant. He reports that he feels \"much better\" since receiving the medication. At the time of assessment, pt denies SI/HI/AH/VH's. PT reports that he needs \"to get back on the horse\" and get back into alcohol treatment. PT reports that he spent a year at [facility] but relapsed shortly after DC. PT reports that he plans to go back to [facility] (or another treatment center) in order to treat his alcoholism. PT states h/o psych meds include Seroquel, Trazodone, and Buspar but has not taken for months d/t not having insurance and prioritizing alcohol. PT also reports that he plans to f/u at [facility] ( [facility]) in order to establish psychiatry, medication management, and transitional living. PT denies current outpatient; denies previous inpatient. Reports that he's been \"cutting down\" and typically drinks 2 24oz cans of beer daily. Denies drug use other than THC.    Consulted with ERP and pt does not meet criteria for inpatient.     ENT LIVING SITUATION/SOCIAL SUPPORT/FINANCIAL RESOURCES: PT reports that he resides in subsidized housing, an apt, through [facility] " authority. He reports that he may be evicted, however, if he is evicted, he plans to return to the [facility] as they can provide case management and temporary shelter. Reports that he typically works as a day , however, stopped working d/t his alcoholism.      BEHAVIORAL HEALTH/SUBSTANCE USE TREATMENT HISTORY  Does patient/parent report a history of prior behavioral health/substance use treatment for patient?   PT denies current outpatient; denies previous inpatient.PT does report that he  plans to f/u at [facility] ( [facility]) in order to establish psychiatry, medication management, and transitional living. PT reports his only inpatient treatment was [facility] from 2023 - 2024.     SAFETY ASSESSMENT - SELF  Does patient acknowledge current or past symptoms of dangerousness to self or is previous history noted? Yes - previous SI.  Does parent/significant other report patient has current or past symptoms of dangerousness to self? N\A  Does presenting problem suggest symptoms of dangerousness to self? No - denies SI.    SAFETY ASSESSMENT - OTHERS  Does patient acknowledge current or past symptoms of aggressive behavior or risk to others or is previous history noted? no  Does parent/significant other report patient has current or past symptoms of aggressive behavior or risk to others?  N\A  Does presenting problem suggest symptoms of dangerousness to others? No    LEGAL HISTORY  Does patient acknowledge history of arrest/assisted/longterm or is previous history noted? yes    Crisis Safety Plan completed and copy given to patient? yes    ABUSE/NEGLECT SCREENING  Does patient report feeling “unsafe” in his/her home, or afraid of anyone?  no  Does patient report any history of physical, sexual, or emotional abuse?  no  Does parent or significant other report any of the above? no  Is there evidence of neglect by self?  no  Is there evidence of neglect by a caregiver? no  Does the patient/parent report any  "history of CPS/APS/police involvement related to suspected abuse/neglect or domestic violence? no  Based on the information provided during the current assessment, is a mandated report of suspected abuse/neglect being made?  No    SUBSTANCE USE SCREENING  Yes:  Luciano all substances used in the past 30 days:      Last Use Amount   [x]   Alcohol     []   Marijuana     []   Heroin     []   Prescription Opioids  (used without prescription, for    recreation, or in excess of prescribed amount)     []   Other Prescription  (used without prescription, for    recreation, or in excess of prescribed amount)     []   Cocaine      []   Methamphetamine     []   \"\" drugs (ectasy, MDMA)     []   Other substances        UDS results: ETOH Withdrawal  Breathalyzer results: 0.00    What consequences does the patient associate with any of the above substance use and or addictive behaviors? None    Risk factors for detox (check all that apply):  []  Seizures   []  Diaphoretic (sweating)   []  Tremors   []  Hallucinations   []  Increased blood pressure   []  Decreased blood pressure   []  Other   []  None      [] Patient education on risk factors for detoxification and instructed to return to ER as needed.      MENTAL STATUS   Participation: Active verbal participation  Grooming: Casual  Orientation: Alert and Fully Oriented  Behavior: Calm  Eye contact: Good  Mood: Euthymic  Affect: Flexible  Thought process: Logical and Goal-directed  Thought content: Within normal limits  Speech: Rate within normal limits and Volume within normal limits  Perception: Within normal limits  Memory:  No gross evidence of memory deficits  Insight: Adequate  Judgment:  Adequate  Other:    Collateral information:   Source:  [] Significant other present in person:   [] Significant other by telephone  [] [facility]   [x] [facility] Nursing Staff  [x] [facility]  [x] Other: ERP    [] Unable to complete full assessment due to:  [] Acute " intoxication  [] Patient declined to participate/engage  [] Patient verbally unresponsive  [] Significant cognitive deficits  [] Significant perceptual distortions or behavioral disorganization  [] Other:       CLINICAL IMPRESSIONS:  Primary:  Alcohol Withdrawal  Secondary:         IDENTIFIED NEEDS/PLAN:  [Trigger DISPOSITION list for any items marked]    []  Imminent safety risk - self [] Imminent safety risk - others   [x]  Acute substance withdrawal []  Psychosis/Impaired reality testing   [x]  Mood/anxiety []  Substance use/Addictive behavior   []  Maladaptive behaviro []  Parent/child conflict   []  Family/Couples conflict []  Biomedical   []  Housing []  Financial   []   Legal  Occupational/Educational   []  Domestic violence []  Other:     Recommended Plan of Care:  Writer RN reviewed community CD and MH resources with pt, with written information given, including AA mtgs, [facility], [facility], [facility], and [facility] as his [place] Medicaid insurance application is submitted and pending authorization. PT reports that he plans to f/u with [facility] or the [facility] his Medicaid is denied.  Writer RN encouraged pt to f/u at outpt  service and attend a sober support meeting; pt verbalized understanding   *[employer] may not be utilized for moderate or high risk patients    Has the Recommended Plan of Care/Level of Observation been reviewed with the patient's assigned nurse? yes    Does patient/parent or guardian express agreement with the above plan? yes    Referral appointment(s) scheduled? N\A    Alert team only: Pt to DC tos self  I have discussed findings and recommendations with Dr. Casimiro Spring who is in agreement with these recommendations.     Referral information sent to the following community providers : NORI Flores R.N., MBA

## 2024-07-07 NOTE — ED TRIAGE NOTES
"Krystina Ankit Bonner  58 y.o. male    Chief Complaint   Patient presents with    Flank Pain    Diarrhea     Pt arrives via EMS with complaints of bilateral flank pain that began 7 days ago with complaints of cold sweats and diarrhea. Denies blood in stool. Per pt urine has been \"clumpy\" and \"orange.\" Denies pain with urination. Denies abdominal pain    Endorses daily ETOH use for past month- states he has had 36oz beer today. Slightly tremulous on triage. Denies drug use. Also out of his mental health medications- would like refills. Scored High Risk on [place] Suicide screening- endorses SI with plan tonight.   Vitals:    07/07/24 0037   BP: (!) 151/90   Pulse: (!) 117   Resp: 14   Temp: 36.7 °C (98 °F)   SpO2: 95%       Triage process explained to patient, apologized for wait time, and returned to lobby.  Pt informed to notify staff of any change in condition.     "

## 2024-07-07 NOTE — DISCHARGE INSTRUCTIONS
We have renewed your psychiatric medications and given you prescription for Librium which you can use for alcohol withdrawal symptoms.  If you have thoughts of hurting yourself or others or uncontrolled withdrawal symptoms please return to the emergency department.

## 2024-07-07 NOTE — ED NOTES
Patient changed into paper scrubs, items removed from room, sitter initiated. Patient medicated per MAR.

## 2024-07-07 NOTE — ED NOTES
"Krystina Bonner has been discharged from the Emergency Room.    IV discontinued and gauze bandage placed, pt in possession of belongings.    Discharge instructions, which include signs and symptoms to monitor patient for, as well as detailed information regarding Alcohol withdrawal provided.  Patient verbalizes understanding of follow up care and medication management. All questions and concerns addressed at this time.     Patient provided with education on when to return to the ER and verbally understands with no concerns. Patient advised on setting up MyChart and information provided about patient survey.  Patient leaves ER in no apparent distress. This RN provided education regarding returning to the ER for any new concerns or changes in patient's condition.      /74   Pulse 97   Temp 37 °C (98.6 °F)   Resp 13   Ht 1.905 m (6' 3\")   Wt 97.3 kg (214 lb 8.1 oz)   SpO2 95%   BMI 26.81 kg/m²   "